# Patient Record
Sex: MALE | Race: WHITE | Employment: OTHER | URBAN - METROPOLITAN AREA
[De-identification: names, ages, dates, MRNs, and addresses within clinical notes are randomized per-mention and may not be internally consistent; named-entity substitution may affect disease eponyms.]

---

## 2019-05-15 ENCOUNTER — HOSPITAL ENCOUNTER (EMERGENCY)
Facility: CLINIC | Age: 79
Discharge: HOME | End: 2019-05-15
Attending: EMERGENCY MEDICINE | Admitting: EMERGENCY MEDICINE
Payer: COMMERCIAL

## 2019-05-15 VITALS
DIASTOLIC BLOOD PRESSURE: 74 MMHG | TEMPERATURE: 98.8 F | RESPIRATION RATE: 19 BRPM | HEART RATE: 64 BPM | HEIGHT: 75 IN | OXYGEN SATURATION: 98 % | SYSTOLIC BLOOD PRESSURE: 155 MMHG | WEIGHT: 240 LBS | BODY MASS INDEX: 29.84 KG/M2

## 2019-05-15 DIAGNOSIS — H92.22 OTORRHAGIA OF LEFT EAR: ICD-10-CM

## 2019-05-15 PROCEDURE — 99283 EMERGENCY DEPT VISIT LOW MDM: CPT | Mod: Z6 | Performed by: EMERGENCY MEDICINE

## 2019-05-15 PROCEDURE — 99283 EMERGENCY DEPT VISIT LOW MDM: CPT

## 2019-05-15 ASSESSMENT — MIFFLIN-ST. JEOR: SCORE: 1884.26

## 2019-05-15 NOTE — ED TRIAGE NOTES
Was driving for about 4 hrs when he felt something draining from his left ear and when he touched it he noticed blood and now has pain in his left ear.

## 2019-05-15 NOTE — ED PROVIDER NOTES
"  History     Chief Complaint   Patient presents with     Ear Drainage     The history is provided by the patient.     Trever Melissa is a 79 year old male who presents to the ED complaining of ear drainage starting earlier today. Patient states he had an PE tube placed in his left ear 4 years ago after having issues with his left ear. Since having the tube placed he stopped having left ear problems and he believes the tube is still in place. Today the patient was driving to visit his friend when he felt like he had some drainage from his left ear. He reached to feel his left ear and noticed there was blood on his hand. The patient adds he now has a mild 3/10 sharp pain in his left ear that is slightly different than baseline and worse when he moves his jaw. Currently the patient also feels his ear is full and he is having difficulty hearing. He adds he takes a baby aspirin everyday. Patient denies congestion, cough, or fever.    Allergies:  Allergies not on file    Problem List:    There are no active problems to display for this patient.     Past Medical History:    No past medical history on file.     Past Surgical History:    No past surgical history on file.    Family History:    No family history on file.    Social History:  Marital Status:    Social History     Tobacco Use     Smoking status: Not on file   Substance Use Topics     Alcohol use: Not on file     Drug use: Not on file        Medications:      No current outpatient medications on file.      Review of Systems   HENT: Positive for ear discharge.    All other systems reviewed and are negative.      Physical Exam   BP: 155/74  Pulse: 64  Temp: 98.8  F (37.1  C)  Resp: 19  Height: 190.5 cm (6' 3\")  Weight: 108.9 kg (240 lb)  SpO2: 98 %      Physical Exam   Constitutional: He appears well-developed and well-nourished.   HENT:   Head: Normocephalic and atraumatic.   Right Ear: External ear normal.   Left Ear: External ear normal.   Nose: Nose normal. "   Mouth/Throat: No oropharyngeal exudate.   Tympanic membrane has what appears to be old blood behind it and blood coming out of it.  Blood coming out of his brighter red and feels part of the canal.  It appears that the tube is encased in the blood.  I cannot tell whether the tube is dislodged from the tympanic membrane or not.  There is no pus or redness to the tympanic membrane.  There does not appear to be any active bleeding.   Eyes: Conjunctivae and EOM are normal.   Cardiovascular: Normal rate.   Pulmonary/Chest: Effort normal.   Abdominal: He exhibits no distension. There is no tenderness.   Musculoskeletal: Normal range of motion. He exhibits no edema or deformity.   Neurological: He is alert.   Nursing note and vitals reviewed.      ED Course        Procedures                 No results found for this or any previous visit (from the past 24 hour(s)).    Medications - No data to display    Assessments & Plan (with Medical Decision Making)  79-year-old with spontaneous bleeding in the left ear.  I think that he had some blood behind the tympanic membrane and bled through the orifice where the ear tube was.  There does not appear to be any active bleeding or pain.  I think the patient is appropriate for follow-up with his ENT surgeon.  I offered to make an appointment here but he says he is just passing through and he will go elsewhere.  Return to ER precautions were discussed.  A cottonball was placed in the left ear.     I have reviewed the nursing notes.    I have reviewed the findings, diagnosis, plan and need for follow up with the patient.       Medication List      There are no discharge medications for this visit.         Final diagnoses:   Otorrhagia of left ear     This document serves as a record of services personally performed by Geovanny Perez MD. It was created on their behalf by Keshia Barreto, a trained medical scribe. The creation of this record is based on the provider's personal  observations and the statements of the patient. This document has been checked and approved by the attending provider.    Note: Chart documentation done in part with Dragon Voice Recognition software. Although reviewed after completion, some word and grammatical errors may remain.    5/15/2019   [facility] EMERGENCY DEPARTMENT     Geovanny Perez MD  05/15/19 1559

## 2019-05-15 NOTE — ED AVS SNAPSHOT
[facility] Emergency Department  [address] [place] [address]  Phone:  [phone]  Fax:  [phone]                                    Trever Melissa   MRN: [id number]    Department:  [facility] Emergency Department   Date of Visit:  5/15/2019           After Visit Summary Signature Page    I have received my discharge instructions, and my questions have been answered. I have discussed any challenges I see with this plan with the nurse or doctor.    ..........................................................................................................................................  Patient/Patient Representative Signature      ..........................................................................................................................................  Patient Representative Print Name and Relationship to Patient    ..................................................               ................................................  Date                                   Time    ..........................................................................................................................................  Reviewed by Signature/Title    ...................................................              ..............................................  Date                                               Time          22EPIC Rev 08/18

## 2019-05-15 NOTE — DISCHARGE INSTRUCTIONS
Look like you had bleeding from your eardrum.  It was difficult to tell because of the blood in your canal.  I recommend follow-up with your ear nose and throat doctor.  For now you can use cotton ball in the ear.